# Patient Record
Sex: FEMALE | Race: WHITE | NOT HISPANIC OR LATINO | URBAN - METROPOLITAN AREA
[De-identification: names, ages, dates, MRNs, and addresses within clinical notes are randomized per-mention and may not be internally consistent; named-entity substitution may affect disease eponyms.]

---

## 2019-05-21 ENCOUNTER — RECORDS - HEALTHEAST (OUTPATIENT)
Dept: LAB | Facility: CLINIC | Age: 69
End: 2019-05-21

## 2019-05-21 LAB
C REACTIVE PROTEIN LHE: 1.9 MG/DL (ref 0–0.8)
ERYTHROCYTE [SEDIMENTATION RATE] IN BLOOD BY WESTERGREN METHOD: 22 MM/HR (ref 0–20)

## 2019-06-03 ASSESSMENT — MIFFLIN-ST. JEOR: SCORE: 1395.4

## 2019-06-17 ENCOUNTER — ANESTHESIA - HEALTHEAST (OUTPATIENT)
Dept: SURGERY | Facility: CLINIC | Age: 69
End: 2019-06-17

## 2019-06-18 ENCOUNTER — AMBULATORY - HEALTHEAST (OUTPATIENT)
Dept: OTHER | Facility: CLINIC | Age: 69
End: 2019-06-18

## 2019-06-18 ENCOUNTER — DOCUMENTATION ONLY (OUTPATIENT)
Dept: OTHER | Facility: CLINIC | Age: 69
End: 2019-06-18

## 2019-06-18 ENCOUNTER — SURGERY - HEALTHEAST (OUTPATIENT)
Dept: SURGERY | Facility: CLINIC | Age: 69
End: 2019-06-18

## 2019-06-18 ASSESSMENT — MIFFLIN-ST. JEOR: SCORE: 1377.71

## 2019-06-20 ASSESSMENT — MIFFLIN-ST. JEOR: SCORE: 1443.48

## 2021-05-29 NOTE — ANESTHESIA PROCEDURE NOTES
Spinal Block    Patient location during procedure: OR  Start time: 6/18/2019 7:33 AM  End time: 6/18/2019 7:37 AM  Reason for block: primary anesthetic    Staffing:  Performing  Anesthesiologist: Iraida Lawton MD    Preanesthetic Checklist  Completed: patient identified, risks, benefits, and alternatives discussed, timeout performed, consent obtained, airway assessed, oxygen available, suction available, emergency drugs available and hand hygiene performed  Spinal Block  Patient position: sitting  Prep: ChloraPrep and site prepped and draped  Patient monitoring: heart rate, continuous pulse ox and blood pressure  Approach: midline  Location: L3-4  Injection technique: single-shot  Needle type: pencil-tip   Needle gauge: 24 G

## 2021-05-29 NOTE — ANESTHESIA POSTPROCEDURE EVALUATION
Patient: Edie Nolasco  RIGHT KNEE REVISION ARTHROPLASTY  Anesthesia type: spinal    Patient location: PACU  Last vitals:   Vitals Value Taken Time   /56 6/18/2019 12:50 PM   Temp 36.2  C (97.1  F) 6/18/2019 12:40 PM   Pulse 61 6/18/2019 12:53 PM   Resp 13 6/18/2019 12:52 PM   SpO2 97 % 6/18/2019 12:53 PM   Vitals shown include unvalidated device data.  Post vital signs: stable  Level of consciousness: awake, alert and oriented  Post-anesthesia pain: pain controlled  Post-anesthesia nausea and vomiting: no  Pulmonary: unassisted, nasal cannula  Cardiovascular: stable and blood pressure at baseline  Hydration: adequate  Anesthetic events: no    QCDR Measures:  ASA# 11 - Mercedes-op Cardiac Arrest: ASA11B - Patient did NOT experience unanticipated cardiac arrest  ASA# 12 - Mercedes-op Mortality Rate: ASA12B - Patient did NOT die  ASA# 13 - PACU Re-Intubation Rate: NA - No ETT / LMA used for case  ASA# 10 - Composite Anes Safety: ASA10A - No serious adverse event    Additional Notes:  Pain improved and tolerable

## 2021-05-29 NOTE — ANESTHESIA PREPROCEDURE EVALUATION
Anesthesia Evaluation      Patient summary reviewed   History of anesthetic complications (PONV, delayed wake up)     Airway   Mallampati: III  Neck ROM: full   Pulmonary - normal exam    breath sounds clear to auscultation  (+) sleep apnea on CPAP, ,                          Cardiovascular   Exercise tolerance: > or = 4 METS  (+) hypertension, valvular problems/murmurs (s/p AVR, bioprosthetic 2016 for severe AS), ,     ECG reviewed  Rhythm: regular  Rate: normal,    murmur Location:upper right sternal border   ROS comment: Lymph edema     Neuro/Psych - negative ROS     Endo/Other    (+) hypothyroidism, arthritis, obesity (BMI 40),      Comments: Glaucoma    GI/Hepatic/Renal    (+) hiatal hernia (s/p Nissen fundoplication),     (-) GERD     Other findings: 6/14/19 Echo  Normal LV size and systolic function  LV EF 60%  Normal RV size and systolic function  Mild TR  Mild AI  [place] aortic valve gradient 17 mmHg  Well seated bioprosthetic AV      Dental    (+) caps    Comment: Molar crowns                       Anesthesia Plan  Planned anesthetic: spinal  Scheduled for total knee revision, 2.5 hrs.    SAB with prn GETA.    Surgeon does NOT want a PNB for post operative pain   ASA 3     Anesthetic plan and risks discussed with: patient  Anesthesia plan special considerations: antiemetics,   Post-op plan: routine recovery

## 2021-05-29 NOTE — ANESTHESIA CARE TRANSFER NOTE
Last vitals:   Vitals:    06/18/19 1148   BP: 107/56   Pulse: 63   Resp: 14   Temp: 36.3  C (97.3  F)   SpO2: 99%     Patient's level of consciousness is drowsy  Spontaneous respirations: yes  Maintains airway independently: yes  Dentition unchanged: yes  Oropharynx: oropharynx clear of all foreign objects    QCDR Measures:  ASA# 20 - Surgical Safety Checklist: WHO surgical safety checklist completed prior to induction    PQRS# 430 - Adult PONV Prevention: 4558F - Pt received => 2 anti-emetic agents (different classes) preop & intraop  ASA# 8 - Peds PONV Prevention: NA - Not pediatric patient, not GA or 2 or more risk factors NOT present  PQRS# 424 - Mercedes-op Temp Management: 4559F - At least one body temp DOCUMENTED => 35.5C or 95.9F within required timeframe  PQRS# 426 - PACU Transfer Protocol: - Transfer of care checklist used  ASA# 14 - Acute Post-op Pain: ASA14B - Patient did NOT experience pain >= 7 out of 10

## 2021-06-03 VITALS — BODY MASS INDEX: 41.46 KG/M2 | WEIGHT: 219.6 LBS | HEIGHT: 61 IN

## 2021-06-16 PROBLEM — Z96.659 FAILED TOTAL KNEE REPLACEMENT, INITIAL ENCOUNTER (H): Status: ACTIVE | Noted: 2019-06-18

## 2021-06-16 PROBLEM — T84.018A FAILED TOTAL KNEE REPLACEMENT, INITIAL ENCOUNTER (H): Status: ACTIVE | Noted: 2019-06-18
